# Patient Record
Sex: MALE | Race: BLACK OR AFRICAN AMERICAN | URBAN - METROPOLITAN AREA
[De-identification: names, ages, dates, MRNs, and addresses within clinical notes are randomized per-mention and may not be internally consistent; named-entity substitution may affect disease eponyms.]

---

## 2017-01-03 ENCOUNTER — PRE VISIT (OUTPATIENT)
Dept: UROLOGY | Facility: CLINIC | Age: 67
End: 2017-01-03

## 2017-01-03 NOTE — TELEPHONE ENCOUNTER
Per pt, he had a combined hernia/circ/vasectomy done at [facility] in 1980 by Dr. Ranjit Cook. Per [facility], these records are located at [facility]. Messages left requesting information to obtain these records. fuad

## 2017-03-10 ENCOUNTER — OFFICE VISIT (OUTPATIENT)
Dept: UROLOGY | Facility: CLINIC | Age: 67
End: 2017-03-10

## 2017-03-10 ENCOUNTER — ALLIED HEALTH/NURSE VISIT (OUTPATIENT)
Dept: UROLOGY | Facility: CLINIC | Age: 67
End: 2017-03-10

## 2017-03-10 VITALS — DIASTOLIC BLOOD PRESSURE: 71 MMHG | SYSTOLIC BLOOD PRESSURE: 115 MMHG | HEART RATE: 83 BPM | WEIGHT: 184 LBS

## 2017-03-10 DIAGNOSIS — Z98.52 S/P VASECTOMY: Primary | ICD-10-CM

## 2017-03-10 RX ORDER — HYDROCHLOROTHIAZIDE 25 MG/1
25 TABLET ORAL
COMMUNITY
Start: 2016-12-08

## 2017-03-10 RX ORDER — LISINOPRIL 40 MG/1
40 TABLET ORAL
COMMUNITY
Start: 2016-12-08

## 2017-03-10 RX ORDER — TIZANIDINE HYDROCHLORIDE 4 MG/1
4 CAPSULE, GELATIN COATED ORAL
COMMUNITY
Start: 2016-09-14 | End: 2018-03-23

## 2017-03-10 RX ORDER — TADALAFIL 10 MG/1
10 TABLET ORAL
COMMUNITY
Start: 2016-12-08 | End: 2018-03-23

## 2017-03-10 RX ORDER — CEFAZOLIN SODIUM 1 G/3ML
1 INJECTION, POWDER, FOR SOLUTION INTRAMUSCULAR; INTRAVENOUS SEE ADMIN INSTRUCTIONS
Status: CANCELLED | OUTPATIENT
Start: 2017-03-10

## 2017-03-10 ASSESSMENT — PAIN SCALES - GENERAL: PAINLEVEL: NO PAIN (0)

## 2017-03-10 NOTE — MR AVS SNAPSHOT
After Visit Summary   3/10/2017    Sushant García    MRN: [id number]           Patient Information     Date Of Birth          1950        Visit Information        Provider Department      3/10/2017 10:40 AM Jan Gentile MD [facility] and [facility] Urologic C[facility]        Today's Diagnoses     S/P vasectomy    -  1       Follow-ups after your visit        Your next 10 appointments already scheduled     May 30, 2017   Procedure with Jan Gentile MD   [facility] ([facility])    [address] [place] [address]  5th [facility] [address] [phone]           Located in the Clinics and Surgery Center at [address] [place] [address].   parking is very convenient and highly recommended.  is a $6 flat rate fee.  Both  and self parkers should enter the main arrival plaza from [facility]; parking attendants will direct you based on your parking preference.            Minor 15, 2017  9:00 AM CDT   (Arrive by 8:45 AM)   Post-Op with Jan Gentile MD   [facility] and [facility]rolog[facility] ([facility])    [address] [place] [address]  4th [facility] [address] [phone]              Who to contact     Please call your clinic at [phone] to:    Ask questions about your health    Make or cancel appointments    Discuss your medicines    Learn about your test results    Speak to your doctor   If you have compliments or concerns about an experience at your clinic, or if you wish to file a complaint, please contact [facility] Patient Relations at [phone] or email us at [email or web address]         Additional Information About Your Visit        MyChart Information     BlueConic is an electronic gateway that provides easy, online access to your medical records. With BlueConic, you can request a clinic  appointment, read your test results, renew a prescription or communicate with your care team.     To sign up for iConnectivityhart visit the website at [email or web address]   You will be asked to enter the access code listed below, as well as some personal information. Please follow the directions to create your username and password.     Your access code is: [email or web address]  Expires: 2017  6:30 AM     Your access code will  in 90 days. If you need help or a new code, please contact your [facility] or call [phone] for assistance.        Care EveryWhere ID     This is your Care EveryWhere ID. This could be used by other organizations to access your [place] medical records  [id number]        Your Vitals Were     Pulse                   83            Blood Pressure from Last 3 Encounters:   03/10/17 115/71   11 136/85    Weight from Last 3 Encounters:   03/10/17 83.5 kg (184 lb)   11 83.5 kg (184 lb)              We Performed the Following     Daniella-Operative Worksheet  (Urology General)        Primary Care Provider    Physician No Ref-Primary       No address on file        Thank you!     Thank you for choosing [facility] UROLOGY AND [facility] FOR PROSTATE AND UROLOGIC CANCERS  for your care. Our goal is always to provide you with excellent care. Hearing back from our patients is one way we can continue to improve our services. Please take a few minutes to complete the written survey that you may receive in the mail after your visit with us. Thank you!             Your Updated Medication List - Protect others around you: Learn how to safely use, store and throw away your medicines at www.disposemymeds.org.          This list is accurate as of: 3/10/17  1:39 PM.  Always use your most recent med list.                   Brand Name Dispense Instructions for use    * HYDROCHLOROTHIAZIDE      25 mg daily.       * hydrochlorothiazide 25 MG tablet    HYDRODIURIL     Take 25 mg  by mouth       * lisinopril 40 MG tablet    PRINIVIL/ZESTRIL     Take 40 mg by mouth daily.       * lisinopril 40 MG tablet    PRINIVIL/ZESTRIL     Take 40 mg by mouth       tadalafil 10 MG tablet    CIALIS     Take 10 mg by mouth       tiZANidine 4 MG capsule    ZANAFLEX     Take 4 mg by mouth       * Notice:  This list has 4 medication(s) that are the same as other medications prescribed for you. Read the directions carefully, and ask your doctor or other care provider to review them with you.

## 2017-03-10 NOTE — LETTER
3/10/2017       RE: Sushant García  [id number]  [facility]   [place] [address]     Dear Colleague,    Thank you for referring your patient, Sushant García, to the [facility] UROLOGY AND INST FOR PROSTATE AND UROLOGIC CANCERS at [facility]. Please see a copy of my visit note below.    Mr. Sushant García is here to discuss fertility options post vasectomy .    HPI:  Sushant García  is a pleasant 66 year old gentleman who underwent a vasectomy about 33 years ago. He has 4 children. His vasectomy was  uncomplicated, and he did not do a followup semen analysis.    His spouse is Elda, aged 28. She is G1 with one child.  Her health is good, cycles are good, and female factor concerns are: none known.     He is here to discuss options for fertility post vasectomy.     REVIEW OF SYSTEMS:  General: negative  Skin: negative  Eyes: negative  Ears/Nose/Throat: negative  Respiratory: negative  Cardiovascular: negative  Gastrointestinal: negative  Genitourinary: see HPI  Musculoskeletal: negative  Neurologic: negative  Psychiatric: negative  Hematologic/Lymphatic/Immunologic: negative  Endocrine: negative      Past Medical History   Diagnosis Date     Hypertension         Past Surgical History   Procedure Laterality Date     Hernia repair          No family history on file.     Social History   Substance Use Topics     Smoking status: Never Smoker     Smokeless tobacco: Not on file     Alcohol use No        Medications as of 3/10/2017:  Current Outpatient Prescriptions   Medication Sig     tadalafil (CIALIS) 10 MG tablet Take 10 mg by mouth     hydrochlorothiazide (HYDRODIURIL) 25 MG tablet Take 25 mg by mouth     lisinopril (PRINIVIL/ZESTRIL) 40 MG tablet Take 40 mg by mouth     tiZANidine (ZANAFLEX) 4 MG capsule Take 4 mg by mouth     HYDROCHLOROTHIAZIDE 25 mg daily.     lisinopril (PRINIVIL,ZESTRIL) 40 MG tablet Take 40 mg by mouth daily.     No current  facility-administered medications for this visit.           Allergies   Allergen Reactions     Atenolol      Other reaction(s): Cough       GENERAL PHYSICAL EXAM:   /71 (BP Location: Right arm, Cuff Size: Adult Large)  Pulse 83  Wt 83.5 kg (184 lb)     Constitutional: No acute distress. Well nourished.   PSYCH: normal mood and affect.  NEURO: normal gait, no focal deficits.   EYES: anicteric, EOMI, PERR.  ENT: neck supple, no lymphadenopathy, mucosae moist, no thrush.  CARDIOPULMONARY: breathing non-labored, pulse regular rate/rhythm, no peripheral edema.  GI: Abdomen soft, non-tender, bilateral inguinal surgical scars, no organomegaly.  MUSCULOSKELETAL: normal limb proportions, no muscle wasting, no contractures.  SKIN: Normal virilized hair distribution, no lesions, warts or rashes over genitalia, abdomen extremities or face.  HEME/LYMPH: no echymosis, no lymphadenopathy in groin or neck, no lymphedema.     EXAM:  Phallus  circumcised, meatus adequate, no plaques palpated.   Left testis descended , size is 12-14cc , consistency is normal . No intra-testicular masses.   Right testis descended , size is 18cc , consistency is normal . No intra-testicular masses.   Epididymes present, non-tender, both enlarged consistent with post vasectomy.   Left cord: Vas present. no granuloma.  Defect in the mid straight portion of the left vas.  Right cord: Vas present. no granuloma.  Defect in the mid straight portion of the right vas.  Grade II-III left varicocele.    Prostate exam: deferred    Imaging/labs:  None    ASSESSMENT: Fertility options post vasectomy 33 years ago.    PLAN:  I reviewed the pros and cons of vasectomy reversal versus  sperm acquisition for use in in-vitro fertilization.   We discussed the considerations of invasiveness to either partner, the number of children desired, cost, and need for possible re-sterilization after a vasectomy reversal.   Overall, either IVF or vas reversal  would be options  for them.   We discussed the patency and pregnancy rates with vasovasostomy and vasoepididymostomy.  We discussed that he hopefully would be a vasovasostomy on each side, but certainly could require a vasoepididymostomy on one or both sides, and that the likelihood of VE increases as time from the initial vasectomy increases.  We discussed sperm retrieval, which could likely be done with testicular aspiration under local in the office and this could then be cryopreserved and used for IVF    I encouraged them to think about their options and let me know how they would like to proceed.       Jan Gentile MD,    Urological Surgeon

## 2017-03-10 NOTE — NURSING NOTE
Pre Op Teaching Flowsheet       Pre and Post op Patient Education  Relevant Diagnosis:  History of vasectomy  Surgical procedure:  Bilateral vasectomy  Teaching Topic:  Pre and post op teaching  Person Involved in teaching: Sushant García    Motivation Level:  Asks Questions: Yes  Eager to Learn:  Yes  Cooperative: Yes  Receptive (willing/able to accept information):  Yes    Patient demonstrates understanding of the following:  Date of surgery:  5/30/17  Location of surgery:  [facility]- 5th Floor  History and Physical and any other testing necessary prior to surgery: Yes  Required time line for completion of History and Physical and any pre-op testing: Yes    Patient demonstrates understanding of the following:  Pre-op bowel prep:  N/A  Pre-op showering/scrub information with PCMX Soap: Yes  Blood thinner medications discussed and when to stop (if applicable):  Yes      Infection Prevention:   Patient demonstrates understanding of the following:  Surgical procedure site care taught: Yes  Signs and symptoms of infection taught:  Yes      Post-op follow-up:  Discussed how to contact the hospital, nurse, and clinic scheduling staff if necessary.    Instructional materials used/given/mailed:  [facility] Booklet, post op teaching sheet, Map, Soap, and arrival/location information.    Surgical instructions packet given to patient in office:  Yes.  Negative on PAC questions

## 2017-03-10 NOTE — MR AVS SNAPSHOT
After Visit Summary   3/10/2017    Sushant García    MRN: [id number]           Patient Information     Date Of Birth          1950        Visit Information        Provider Department      3/10/2017 12:00 PM Nurse, [facility] and [facility] for Prostate and Urologic Cancers        Today's Diagnoses     S/P vasectomy    -  1       Follow-ups after your visit        Your next 10 appointments already scheduled     May 30, 2017   Procedure with Jan Gentile MD   [facility] ([facility])    [address] [place] [address]  5th [facility] [id number] [phone]           Located in the Clinics and Surgery Center at [address] [place] [address].   parking is very convenient and highly recommended.  is a $6 flat rate fee.  Both  and self parkers should enter the main arrival plaza from [facility]; parking attendants will direct you based on your parking preference.            Minor 15, 2017  9:00 AM CDT   (Arrive by 8:45 AM)   Post-Op with Jan Gentile MD   [facility] and [facility] for Prostate and Urologic Cancers ([facility])    [address] [place] [address]  4th [facility] [address] [phone]              Who to contact     Please call your clinic at [phone] to:    Ask questions about your health    Make or cancel appointments    Discuss your medicines    Learn about your test results    Speak to your doctor   If you have compliments or concerns about an experience at your clinic, or if you wish to file a complaint, please contact [facility] Patient Relations at [phone] or email us at [email or web address]         Additional Information About Your Visit        Abigail Stewarthart Information     Open Mile is an electronic gateway that provides easy, online access to your medical records. With Open Mile, you can request a  clinic appointment, read your test results, renew a prescription or communicate with your care team.     To sign up for Beam Technologieshart visit the website at [email or web address]   You will be asked to enter the access code listed below, as well as some personal information. Please follow the directions to create your username and password.     Your access code is: [email or web address]  Expires: 2017  6:30 AM     Your access code will  in 90 days. If you need help or a new code, please contact your [facility] or call [phone] for assistance.        Care EveryWhere ID     This is your Care EveryWhere ID. This could be used by other organizations to access your [place] medical records  [id number]         Blood Pressure from Last 3 Encounters:   03/10/17 115/71   11 136/85    Weight from Last 3 Encounters:   03/10/17 83.5 kg (184 lb)   11 83.5 kg (184 lb)              Today, you had the following     No orders found for display       Primary Care Provider    Physician No Ref-Primary       No address on file        Thank you!     Thank you for choosing [facility]OLOGY AND [facility] FOR PROSTATE AND UROLOGIC CANCERS  for your care. Our goal is always to provide you with excellent care. Hearing back from our patients is one way we can continue to improve our services. Please take a few minutes to complete the written survey that you may receive in the mail after your visit with us. Thank you!             Your Updated Medication List - Protect others around you: Learn how to safely use, store and throw away your medicines at www.disposemymeds.org.          This list is accurate as of: 3/10/17  2:54 PM.  Always use your most recent med list.                   Brand Name Dispense Instructions for use    * HYDROCHLOROTHIAZIDE      25 mg daily.       * hydrochlorothiazide 25 MG tablet    HYDRODIURIL     Take 25 mg by mouth       * lisinopril 40 MG tablet    PRINIVIL/ZESTRIL      Take 40 mg by mouth daily.       * lisinopril 40 MG tablet    PRINIVIL/ZESTRIL     Take 40 mg by mouth       tadalafil 10 MG tablet    CIALIS     Take 10 mg by mouth       tiZANidine 4 MG capsule    ZANAFLEX     Take 4 mg by mouth       * Notice:  This list has 4 medication(s) that are the same as other medications prescribed for you. Read the directions carefully, and ask your doctor or other care provider to review them with you.

## 2017-03-10 NOTE — PROGRESS NOTES
Mr. Sushant García is here to discuss fertility options post vasectomy .    HPI:  Sushant García  is a pleasant 66 year old gentleman who underwent a vasectomy about 33 years ago. He has 4 children. His vasectomy was  uncomplicated, and he did not do a followup semen analysis.    His spouse is Elda, aged 28. She is G1 with one child.  Her health is good, cycles are good, and female factor concerns are: none known.     He is here to discuss options for fertility post vasectomy.     REVIEW OF SYSTEMS:  General: negative  Skin: negative  Eyes: negative  Ears/Nose/Throat: negative  Respiratory: negative  Cardiovascular: negative  Gastrointestinal: negative  Genitourinary: see HPI  Musculoskeletal: negative  Neurologic: negative  Psychiatric: negative  Hematologic/Lymphatic/Immunologic: negative  Endocrine: negative      Past Medical History   Diagnosis Date     Hypertension         Past Surgical History   Procedure Laterality Date     Hernia repair          No family history on file.     Social History   Substance Use Topics     Smoking status: Never Smoker     Smokeless tobacco: Not on file     Alcohol use No        Medications as of 3/10/2017:  Current Outpatient Prescriptions   Medication Sig     tadalafil (CIALIS) 10 MG tablet Take 10 mg by mouth     hydrochlorothiazide (HYDRODIURIL) 25 MG tablet Take 25 mg by mouth     lisinopril (PRINIVIL/ZESTRIL) 40 MG tablet Take 40 mg by mouth     tiZANidine (ZANAFLEX) 4 MG capsule Take 4 mg by mouth     HYDROCHLOROTHIAZIDE 25 mg daily.     lisinopril (PRINIVIL,ZESTRIL) 40 MG tablet Take 40 mg by mouth daily.     No current facility-administered medications for this visit.           Allergies   Allergen Reactions     Atenolol      Other reaction(s): Cough       GENERAL PHYSICAL EXAM:   /71 (BP Location: Right arm, Cuff Size: Adult Large)  Pulse 83  Wt 83.5 kg (184 lb)     Constitutional: No acute distress. Well nourished.   PSYCH: normal mood and  affect.  NEURO: normal gait, no focal deficits.   EYES: anicteric, EOMI, PERR.  ENT: neck supple, no lymphadenopathy, mucosae moist, no thrush.  CARDIOPULMONARY: breathing non-labored, pulse regular rate/rhythm, no peripheral edema.  GI: Abdomen soft, non-tender, bilateral inguinal surgical scars, no organomegaly.  MUSCULOSKELETAL: normal limb proportions, no muscle wasting, no contractures.  SKIN: Normal virilized hair distribution, no lesions, warts or rashes over genitalia, abdomen extremities or face.  HEME/LYMPH: no echymosis, no lymphadenopathy in groin or neck, no lymphedema.     EXAM:  Phallus  circumcised, meatus adequate, no plaques palpated.   Left testis descended , size is 12-14cc , consistency is normal . No intra-testicular masses.   Right testis descended , size is 18cc , consistency is normal . No intra-testicular masses.   Epididymes present, non-tender, both enlarged consistent with post vasectomy.   Left cord: Vas present. no granuloma.  Defect in the mid straight portion of the left vas.  Right cord: Vas present. no granuloma.  Defect in the mid straight portion of the right vas.  Grade II-III left varicocele.    Prostate exam: deferred    Imaging/labs:  None    ASSESSMENT: Fertility options post vasectomy 33 years ago.    PLAN:  I reviewed the pros and cons of vasectomy reversal versus  sperm acquisition for use in in-vitro fertilization.   We discussed the considerations of invasiveness to either partner, the number of children desired, cost, and need for possible re-sterilization after a vasectomy reversal.   Overall, either IVF or vas reversal  would be options for them.   We discussed the patency and pregnancy rates with vasovasostomy and vasoepididymostomy.  We discussed that he hopefully would be a vasovasostomy on each side, but certainly could require a vasoepididymostomy on one or both sides, and that the likelihood of VE increases as time from the initial vasectomy increases.  We  discussed sperm retrieval, which could likely be done with testicular aspiration under local in the office and this could then be cryopreserved and used for IVF    I encouraged them to think about their options and let me know how they would like to proceed.       Jan Gentile MD,    Urological Surgeon

## 2017-05-15 ENCOUNTER — TELEPHONE (OUTPATIENT)
Dept: UROLOGY | Facility: CLINIC | Age: 67
End: 2017-05-15

## 2017-05-15 NOTE — TELEPHONE ENCOUNTER
Patient called to R/S due to insurance and having to pay out of packet. Patient was R/S to 11/28/17 at 8:00am with a 6:00am check in. Patients post op was also R/S to Dec. 14 th at 9:00am patient had no further questions.

## 2018-03-23 RX ORDER — ATORVASTATIN CALCIUM 40 MG/1
40 TABLET, FILM COATED ORAL DAILY
COMMUNITY

## 2018-03-26 ENCOUNTER — ANESTHESIA EVENT (OUTPATIENT)
Dept: SURGERY | Facility: AMBULATORY SURGERY CENTER | Age: 68
End: 2018-03-26

## 2018-03-27 ENCOUNTER — SURGERY (OUTPATIENT)
Age: 68
End: 2018-03-27

## 2018-03-27 ENCOUNTER — HOSPITAL ENCOUNTER (OUTPATIENT)
Facility: AMBULATORY SURGERY CENTER | Age: 68
End: 2018-03-27
Attending: UROLOGY
Payer: MEDICARE

## 2018-03-27 ENCOUNTER — ANESTHESIA (OUTPATIENT)
Dept: SURGERY | Facility: AMBULATORY SURGERY CENTER | Age: 68
End: 2018-03-27

## 2018-03-27 VITALS
SYSTOLIC BLOOD PRESSURE: 112 MMHG | WEIGHT: 184 LBS | DIASTOLIC BLOOD PRESSURE: 67 MMHG | RESPIRATION RATE: 16 BRPM | HEIGHT: 67 IN | OXYGEN SATURATION: 95 % | HEART RATE: 78 BPM | TEMPERATURE: 97 F | BODY MASS INDEX: 28.88 KG/M2

## 2018-03-27 DIAGNOSIS — Z31.0 H/O VASOVASOSTOMY: Primary | ICD-10-CM

## 2018-03-27 DIAGNOSIS — Z98.52 S/P VASECTOMY: Primary | ICD-10-CM

## 2018-03-27 RX ORDER — LIDOCAINE HYDROCHLORIDE 20 MG/ML
INJECTION, SOLUTION INFILTRATION; PERINEURAL PRN
Status: DISCONTINUED | OUTPATIENT
Start: 2018-03-27 | End: 2018-03-27

## 2018-03-27 RX ORDER — MAGNESIUM HYDROXIDE 1200 MG/15ML
LIQUID ORAL PRN
Status: DISCONTINUED | OUTPATIENT
Start: 2018-03-27 | End: 2018-03-27 | Stop reason: HOSPADM

## 2018-03-27 RX ORDER — PROPOFOL 10 MG/ML
INJECTION, EMULSION INTRAVENOUS CONTINUOUS PRN
Status: DISCONTINUED | OUTPATIENT
Start: 2018-03-27 | End: 2018-03-27

## 2018-03-27 RX ORDER — SODIUM CHLORIDE, SODIUM LACTATE, POTASSIUM CHLORIDE, CALCIUM CHLORIDE 600; 310; 30; 20 MG/100ML; MG/100ML; MG/100ML; MG/100ML
INJECTION, SOLUTION INTRAVENOUS CONTINUOUS
Status: DISCONTINUED | OUTPATIENT
Start: 2018-03-27 | End: 2018-03-27 | Stop reason: HOSPADM

## 2018-03-27 RX ORDER — ONDANSETRON 2 MG/ML
4 INJECTION INTRAMUSCULAR; INTRAVENOUS EVERY 30 MIN PRN
Status: DISCONTINUED | OUTPATIENT
Start: 2018-03-27 | End: 2018-03-28 | Stop reason: HOSPADM

## 2018-03-27 RX ORDER — ONDANSETRON 2 MG/ML
INJECTION INTRAMUSCULAR; INTRAVENOUS PRN
Status: DISCONTINUED | OUTPATIENT
Start: 2018-03-27 | End: 2018-03-27

## 2018-03-27 RX ORDER — EPHEDRINE SULFATE 50 MG/ML
INJECTION, SOLUTION INTRAMUSCULAR; INTRAVENOUS; SUBCUTANEOUS PRN
Status: DISCONTINUED | OUTPATIENT
Start: 2018-03-27 | End: 2018-03-27

## 2018-03-27 RX ORDER — LIDOCAINE 40 MG/G
CREAM TOPICAL
Status: DISCONTINUED | OUTPATIENT
Start: 2018-03-27 | End: 2018-03-27 | Stop reason: HOSPADM

## 2018-03-27 RX ORDER — NALOXONE HYDROCHLORIDE 0.4 MG/ML
.1-.4 INJECTION, SOLUTION INTRAMUSCULAR; INTRAVENOUS; SUBCUTANEOUS
Status: DISCONTINUED | OUTPATIENT
Start: 2018-03-27 | End: 2018-03-28 | Stop reason: HOSPADM

## 2018-03-27 RX ORDER — ONDANSETRON 4 MG/1
4 TABLET, ORALLY DISINTEGRATING ORAL EVERY 30 MIN PRN
Status: DISCONTINUED | OUTPATIENT
Start: 2018-03-27 | End: 2018-03-28 | Stop reason: HOSPADM

## 2018-03-27 RX ORDER — FENTANYL CITRATE 50 UG/ML
25-50 INJECTION, SOLUTION INTRAMUSCULAR; INTRAVENOUS
Status: DISCONTINUED | OUTPATIENT
Start: 2018-03-27 | End: 2018-03-27 | Stop reason: HOSPADM

## 2018-03-27 RX ORDER — SODIUM CHLORIDE, SODIUM LACTATE, POTASSIUM CHLORIDE, CALCIUM CHLORIDE 600; 310; 30; 20 MG/100ML; MG/100ML; MG/100ML; MG/100ML
INJECTION, SOLUTION INTRAVENOUS CONTINUOUS
Status: DISCONTINUED | OUTPATIENT
Start: 2018-03-27 | End: 2018-03-28 | Stop reason: HOSPADM

## 2018-03-27 RX ORDER — PROPOFOL 10 MG/ML
INJECTION, EMULSION INTRAVENOUS PRN
Status: DISCONTINUED | OUTPATIENT
Start: 2018-03-27 | End: 2018-03-27

## 2018-03-27 RX ORDER — FENTANYL CITRATE 50 UG/ML
INJECTION, SOLUTION INTRAMUSCULAR; INTRAVENOUS PRN
Status: DISCONTINUED | OUTPATIENT
Start: 2018-03-27 | End: 2018-03-27

## 2018-03-27 RX ORDER — OXYCODONE AND ACETAMINOPHEN 5; 325 MG/1; MG/1
1 TABLET ORAL EVERY 6 HOURS PRN
Qty: 24 TABLET | Refills: 0 | Status: SHIPPED | OUTPATIENT
Start: 2018-03-27

## 2018-03-27 RX ORDER — DEXAMETHASONE SODIUM PHOSPHATE 4 MG/ML
INJECTION, SOLUTION INTRA-ARTICULAR; INTRALESIONAL; INTRAMUSCULAR; INTRAVENOUS; SOFT TISSUE PRN
Status: DISCONTINUED | OUTPATIENT
Start: 2018-03-27 | End: 2018-03-27

## 2018-03-27 RX ORDER — AMOXICILLIN 250 MG
1-2 CAPSULE ORAL 2 TIMES DAILY
Qty: 100 TABLET | Refills: 0 | Status: SHIPPED | OUTPATIENT
Start: 2018-03-27

## 2018-03-27 RX ORDER — BUPIVACAINE HYDROCHLORIDE 5 MG/ML
INJECTION, SOLUTION PERINEURAL PRN
Status: DISCONTINUED | OUTPATIENT
Start: 2018-03-27 | End: 2018-03-27 | Stop reason: HOSPADM

## 2018-03-27 RX ORDER — GABAPENTIN 300 MG/1
300 CAPSULE ORAL ONCE
Status: COMPLETED | OUTPATIENT
Start: 2018-03-27 | End: 2018-03-27

## 2018-03-27 RX ORDER — ACETAMINOPHEN 325 MG/1
975 TABLET ORAL ONCE
Status: COMPLETED | OUTPATIENT
Start: 2018-03-27 | End: 2018-03-27

## 2018-03-27 RX ADMIN — SODIUM CHLORIDE, SODIUM LACTATE, POTASSIUM CHLORIDE, CALCIUM CHLORIDE: 600; 310; 30; 20 INJECTION, SOLUTION INTRAVENOUS at 06:40

## 2018-03-27 RX ADMIN — EPHEDRINE SULFATE 5 MG: 50 INJECTION, SOLUTION INTRAMUSCULAR; INTRAVENOUS; SUBCUTANEOUS at 08:04

## 2018-03-27 RX ADMIN — GABAPENTIN 300 MG: 300 CAPSULE ORAL at 06:30

## 2018-03-27 RX ADMIN — ONDANSETRON 4 MG: 2 INJECTION INTRAMUSCULAR; INTRAVENOUS at 09:50

## 2018-03-27 RX ADMIN — FENTANYL CITRATE 50 MCG: 50 INJECTION, SOLUTION INTRAMUSCULAR; INTRAVENOUS at 07:58

## 2018-03-27 RX ADMIN — EPHEDRINE SULFATE 5 MG: 50 INJECTION, SOLUTION INTRAMUSCULAR; INTRAVENOUS; SUBCUTANEOUS at 08:12

## 2018-03-27 RX ADMIN — FENTANYL CITRATE 25 MCG: 50 INJECTION, SOLUTION INTRAMUSCULAR; INTRAVENOUS at 10:00

## 2018-03-27 RX ADMIN — Medication 100 MCG: at 08:52

## 2018-03-27 RX ADMIN — PROPOFOL 170 MG: 10 INJECTION, EMULSION INTRAVENOUS at 08:00

## 2018-03-27 RX ADMIN — LIDOCAINE HYDROCHLORIDE 100 MG: 20 INJECTION, SOLUTION INFILTRATION; PERINEURAL at 08:00

## 2018-03-27 RX ADMIN — Medication 100 MCG: at 08:43

## 2018-03-27 RX ADMIN — ACETAMINOPHEN 975 MG: 325 TABLET ORAL at 06:30

## 2018-03-27 RX ADMIN — Medication 100 MCG: at 08:47

## 2018-03-27 RX ADMIN — SODIUM CHLORIDE, SODIUM LACTATE, POTASSIUM CHLORIDE, CALCIUM CHLORIDE: 600; 310; 30; 20 INJECTION, SOLUTION INTRAVENOUS at 10:00

## 2018-03-27 RX ADMIN — MAGNESIUM HYDROXIDE 500 ML: 1200 LIQUID ORAL at 08:22

## 2018-03-27 RX ADMIN — PROPOFOL 200 MCG/KG/MIN: 10 INJECTION, EMULSION INTRAVENOUS at 08:00

## 2018-03-27 RX ADMIN — DEXAMETHASONE SODIUM PHOSPHATE 4 MG: 4 INJECTION, SOLUTION INTRA-ARTICULAR; INTRALESIONAL; INTRAMUSCULAR; INTRAVENOUS; SOFT TISSUE at 08:10

## 2018-03-27 RX ADMIN — BUPIVACAINE HYDROCHLORIDE 20 ML: 5 INJECTION, SOLUTION PERINEURAL at 08:21

## 2018-03-27 RX ADMIN — Medication 100 MCG: at 08:58

## 2018-03-27 RX ADMIN — Medication 100 MCG: at 08:29

## 2018-03-27 RX ADMIN — FENTANYL CITRATE 25 MCG: 50 INJECTION, SOLUTION INTRAMUSCULAR; INTRAVENOUS at 10:11

## 2018-03-27 ASSESSMENT — LIFESTYLE VARIABLES: TOBACCO_USE: 0

## 2018-03-27 NOTE — ANESTHESIA PREPROCEDURE EVALUATION
Anesthesia Evaluation     . Pt has had prior anesthetic.     No history of anesthetic complications          ROS/MED HX    ENT/Pulmonary:  - neg pulmonary ROS    (-) tobacco use   Neurologic:  - neg neurologic ROS     Cardiovascular:     (+) hypertension----. : . . . :. . Previous cardiac testing date:results:date: results:ECG reviewed date: results:SB @59 date: results:          METS/Exercise Tolerance:  >4 METS   Hematologic:  - neg hematologic  ROS       Musculoskeletal:  - neg musculoskeletal ROS       GI/Hepatic:  - neg GI/hepatic ROS       Renal/Genitourinary:  - ROS Renal section negative       Endo:  - neg endo ROS       Psychiatric:  - neg psychiatric ROS       Infectious Disease:  - neg infectious disease ROS       Malignancy:      - no malignancy   Other:    - neg other ROS                 Physical Exam  Normal systems: cardiovascular, pulmonary and dental    Airway   Mallampati: I  TM distance: >3 FB  Neck ROM: full    Dental     Cardiovascular   Rhythm and rate: regular and normal      Pulmonary    breath sounds clear to auscultation                    Anesthesia Plan      History & Physical Review  History and physical reviewed and following examination; no interval change.    ASA Status:  1 .    NPO Status:  > 8 hours    Plan for General and LMA with Intravenous and Propofol induction. Maintenance will be TIVA.    PONV prophylaxis:  Ondansetron (or other 5HT-3) and Dexamethasone or Solumedrol       Postoperative Care  Postoperative pain management:  Multi-modal analgesia.      Consents  Anesthetic plan, risks, benefits and alternatives discussed with:  Patient.  Use of blood products discussed: No .   .                          .

## 2018-03-27 NOTE — PROGRESS NOTES
I called and spoke to pt at home just now.  Discussed that his vasectomy was done up in the abdomen beneath his previous hernia repairs, so I was not able to do the vasovasostomy today.  I apologized and he is understanding.  We'll work at getting him a refund for as much as possible and help them get in touch with IVF clinic.  He would like me to do the surgical sperm acquisition for IVF when the time comes.    Sania SUN

## 2018-03-27 NOTE — ANESTHESIA CARE TRANSFER NOTE
Patient: Sushant García    Procedure(s):  Scrotal exploration - Wound Class: I-Clean    Diagnosis: Status Post Vasectomy  Diagnosis Additional Information: No value filed.    Anesthesia Type:   No value filed.     Note:  Airway :Face Mask  Patient transferred to:PACU  Comments: %Uneventful transport to PACU   Report to RN  Exchanging well  Pt responds appropriately to command  Pt comfortable  IV patent  Lips/teeth/dentition as preop status  Questions answered  /69  HR 91 nsr  TAT  36.5  RR 16  Sat  99%Handoff Report: Identifed the Patient, Identified the Reponsible Provider, Reviewed the pertinent medical history, Discussed the surgical course, Reviewed Intra-OP anesthesia mangement and issues during anesthesia, Set expectations for post-procedure period and Allowed opportunity for questions and acknowledgement of understanding      Vitals: (Last set prior to Anesthesia Care Transfer)    CRNA VITALS  3/27/2018 0952 - 3/27/2018 1028      3/27/2018             Pulse: 89    SpO2: 98 %    Resp Rate (observed): 12                Electronically Signed By: HEATHER BAKER CRNA  March 27, 2018  10:28 AM

## 2018-03-27 NOTE — ANESTHESIA POSTPROCEDURE EVALUATION
Patient: Sushant García    Procedure(s):  Scrotal exploration - Wound Class: I-Clean    Diagnosis:Status Post Vasectomy  Diagnosis Additional Information: No value filed.    Anesthesia Type:  No value filed.    Note:  Anesthesia Post Evaluation    Patient location during evaluation: Phase 2  Patient participation: Able to fully participate in evaluation  Level of consciousness: awake and alert  Pain management: adequate  Airway patency: patent  Cardiovascular status: acceptable  Respiratory status: acceptable  Hydration status: acceptable  PONV: none     Anesthetic complications: None          Last vitals:  Vitals:    03/27/18 1045 03/27/18 1050 03/27/18 1058   BP: 101/76 104/70 110/77   Resp: 8 12 11   Temp:  36.1  C (97  F) 36.1  C (97  F)   SpO2: 99% 99% 99%         Electronically Signed By: Kit Rehman DO  March 27, 2018  11:07 AM

## 2018-03-27 NOTE — IP AVS SNAPSHOT
[facility]    [address] [place] [address]    [address] [facility] [address]    Phone:  [phone]    Fax:  [phone]                                       After Visit Summary   3/27/2018    Sushant García    MRN: [id number]           After Visit Summary Signature Page     I have received my discharge instructions, and my questions have been answered. I have discussed any challenges I see with this plan with the nurse or doctor.    ..........................................................................................................................................  Patient/Patient Representative Signature      ..........................................................................................................................................  Patient Representative Print Name and Relationship to Patient    ..................................................               ................................................  Date                                            Time    ..........................................................................................................................................  Reviewed by Signature/Title    ...................................................              ..............................................  Date                                                            Time

## 2018-03-27 NOTE — OP NOTE
OPERATIVE REPORT    PREOPERATIVE DIAGNOSIS: History of vasectomy, desiring fertility   POSTOPERATIVE DIAGNOSIS: History of vasectomy, desiring fertility    PROCEDURE:  1. Scrotal exploration    ANESTHESIA: General endotracheal and local anesthesia.    STAFF SURGEON: Jan Gentile MD      RESIDENT SURGEON: Malcom Cortes MD    ESTIMATED BLOOD LOSS: <5 mL  COMPLICATIONS: None immediately.   DRAINS: None  SPECIMENS: None    FINDINGS: Efflux of testicular fluid seen from testicular end of R vas and intraoperative light microscopy revealed motile sperm seen in fluids.      INDICATIONS: Sushant García is an 67 year old gentleman who underwent a vasectomy in 1984. He has interested in a reversal to have additional children. After a discussion of risks, benefits, and alternatives, the patient provided consented to undergo the aforementioned procedure.  In preoperative clinic and today before incision, the vasectomy site felt palpable in the scrotal vas deferens bilaterally.    DESCRIPTION OF THE PROCEDURE: After verifying informed consent, the patient was brought to the operating room and placed in the supine position on the operating room table. All pressure points were adequately padded and pneumatic compression devices were applied to the patient's bilateral lower extremities. The patient was prepped and draped in the usual sterile fashion. A timeout was performed to confirm the correct patient, positioning, procedure, and operative sites.     We began the procedure on the right side. 2 cc of a 1:1 mixture of 1% lidocaine and 0.25% Marcaine was applied over the superficial skin over an approximately 4 cm area. A 15 blade scalpel was used to incise the skin, and cautery was used to dissect down through the dartos muscle to the tunica vaginalis. The right testis was delivered from the incision.    We turned our attention to the vas deferens, and were unable to identify the site of the previous vasectomy in the scrotum.  It  was thought that due he patient's prior history of bilateral inguinal hernia repair, that the vasectomy incision was made higher in the cord, possible close to the external inguinal ring.  As we had already made a scrotal approach, an area of vas in the scrotum was carefully dissected free from the inferior vasculature. A partial vasotomy at the junction of the straight and convoluted vas deferens was made and a sample of clear liquid efflux was observed from the testicular end of the vas.  Sperm was confirmed with light microscopy.  We then irrigated the distal vas, which was found to be obstructed.  We then cannulated the distal end with a 2-0 prolene suture and found that the area of obstruction was in the inguinal area.  The vasotomy was closed in 2 layer fashion with interrupted 10-0 nylon suture from the inner layer, and interrupted 9-0 nylon for the outer layer.  We proceeded to make an inguinal incision in hopes of isolating the vasectomy defect close to the external ring.  Unfortunately, we were not able to appreciate the vasectomy defect in this area.  When it became clear that the vasectomy ligation was made like proximal to the external ring, the decision was made to abort any attempt at vasovasostomy, as this would require a much more extensive incision, as well as taking down his previous hernia repair.  Also there was a good degree of scar tissue in this area and I was worried the vasectomy defect would still not be identifiable due to extensive scarring under the herniorrhaphy. After conferring with the patient's spouse, the decision was made to conclude the procedure.  We closed the Campers fascia with running 3-0 chomic and subcutanous 4-0 monocryl for the subinguinal incision.  The dartos muscle was closed with a running 3-0 chromic and the scrotal skin was closed a running 4-0 Monocryl suture, placed in a subcuticular fashion. An additional 10 cc's of local anesthesia was instilled over the  incisions.    The patient tolerated the procedure well, was awakened from anesthesia, and returned to the recovery room in stable condition. Fluffs and a scrotal supporter were placed    I was present and scrubbed for the entire procedure.  Jan Gentile MD  Urology Staff

## 2018-03-27 NOTE — IP AVS SNAPSHOT
MRN:[id number]                      After Visit Summary   3/27/2018    Sushant García    MRN: [id number]           Thank you!     Thank you for choosing [facility] for your care. Our goal is always to provide you with excellent care. Hearing back from our patients is one way we can continue to improve our services. Please take a few minutes to complete the written survey that you may receive in the mail after you visit with us. Thank you!        Patient Information     Date Of Birth          1950        About your hospital stay     You were admitted on:  March 27, 2018 [facility]    You were discharged on:  March 27, 2018       Who to Call     For medical emergencies, please call 911.  For non-urgent questions about your medical care, please call your primary care provider or clinic, [phone]  For questions related to your surgery, please call your surgery clinic        Attending Provider     Provider Specialty    Jan Gentile MD Urology       Primary Care Provider Office Phone # Fax #    Stephanie Kaplan [phone]      After Care Instructions     Discharge Instructions       Activity  - No strenuous exercise for 3 weeks.  - No lifting, pushing, pulling more than 15 pounds for 3 weeks.   - Do not strain with bowel movements.  - Do not drive until you can press the brake pedal quickly and fully without pain.   - Do not operate a motor vehicle while taking narcotic pain medications.     Incisions  - You may shower and get incisions wet starting 48 hrs after surgery.  - Do not scrub incisions or submerge wounds (bath, pool, hot tub, etc) for 2 weeks.   - Your stitches will fall out on their own in 2-4 weeks  - Leave incision open to air.  Cover with gauze only if needed for comfort or to protect clothing from drainage.     Medications  - Do not take any additional Tylenol (acetaminophen) while using Percocet or  Vicodin.  - Do not take more than 4,000mg of Tylenol (acetaminophen) in any 24 hour period, as this can cause liver damage.  - Take stool softeners such as Senna while you are using narcotics, but stop if you develop diarrhea.   - Wean yourself off of narcotic pain medications.     Follow-Up:  - Call or return sooner than your regularly scheduled visit if you develop any of the following:  fever, uncontrolled pain, uncontrolled nausea or vomiting, as well as increased redness, swelling, or drainage from your wound.  You may call the Urology Clinic during daytime hours at [phone].  If after hours, call [phone] and ask to speak with the Urology resident on call.                  Your next 10 appointments already scheduled     Apr 12, 2018  9:00 AM CDT   (Arrive by 8:45 AM)   Post-Op with Jan Gentile MD   [facility] ([facility])    [address]  [address] [facility] [address] [phone]              Further instructions from your care team       [facility]  Home Care Following Anesthesia  For 24 hours after surgery:  1. Get plenty of rest.  A responsible adult must stay with you for at least 24 hours after you leave the surgery center.  2. Do not drive or use heavy equipment.  If you have weakness or tingling, don't drive or use heavy equipment until this feeling goes away.   3. Do not drink alcohol.   4. Avoid strenuous or risky activities.  Ask for help when climbing stairs.  5. You may feel lightheaded.  IF so, sit for a few minutes before standing.  Have someone help you get up.   6. If you have nausea (feel sick to your stomach): Drink only clear liquids such as apple juice, ginger ale, broth or 7-Up.  Rest may also help.  Be sure to drink enough fluids.  Move to a regular diet as you feel able.   7. You may have a slight fever.  Call the doctor if your fever is over  100 F (37.7 C) (taken under the tongue) or lasts longer than 24 hours.  8. You may have a dry mouth, a sore throat, muscle aches or trouble sleeping. These should go away after 24 hours.  9. Do not make important or legal decisions.               Tips for taking pain medications  To get the best pain relief possible, remember these points:    Take pain medications as directed, before pain becomes severe.    Pain medication can upset your stomach: taking it with food may help.    Constipation is a common side effect of pain medication. Drink plenty of  fluids.    Eat foods high in fiber. Take a stool softener if recommended by your doctor or pharmacist.    Do not drink alcohol, drive or operate machinery while taking pain medications.    Ask about other ways to control pain, such as with heat, ice or relaxation.    Tylenol/Acetaminophen Consumption  To help encourage the safe use of acetaminophen, the makers of TYLENOL  have lowered the maximum daily dose for single-ingredient Extra Strength TYLENOL  (acetaminophen) products sold in the [place] from 8 pills per day (4,000 mg) to 6 pills per day (3,000 mg). The dosing interval has also changed from 2 pills every 4-6 hours to 2 pills every 6 hours.    If you feel your pain relief is insufficient, you may take Tylenol/Acetaminophen in addition to your narcotic pain medication.     Be careful not to exceed 3,000 mg of Tylenol/Acetaminophen in a 24 hour period from all sources.    If you are taking extra strength Tylenol/acetaminophen (500 mg), the maximum dose is 6 tablets in 24 hours.    If you are taking regular strength acetaminophen (325 mg), the maximum dose is 9 tablets in 24 hours.    Call a doctor for any of the followin. Signs of infection (fever, growing tenderness at the surgery site, a large amount of drainage or bleeding, severe pain, foul-smelling drainage, redness, swelling).  2. It has been over 8 to 10 hours since surgery and you are still not able to  "urinate (pass water).  3. Headache for over 24 hours.  4. Numbness, tingling or weakness the day after surgery (if you had spinal anesthesia).  Your doctor is:  Dr. Jan Gentile, Prostate and Urology: [phone]                    Or dial [phone] and ask for the resident on call for:  Prostate Urology  For emergency care, call the:  [facility]:  [phone] (TTY for hearing impaired: [phone])                Pending Results     No orders found from 3/25/2018 to 3/28/2018.            Admission Information     Date & Time Provider Department Dept. Phone    3/27/2018 Jan Gentile MD [facility] [phone]      Your Vitals Were     Blood Pressure Pulse Temperature Respirations Height Weight    111/67 75 97  F (36.1  C) (Temporal) 11 1.702 m (5' 7\") 83.5 kg (184 lb)    Pulse Oximetry BMI (Body Mass Index)                94% 28.82 kg/m2          [employer] Information     [employer] is an electronic gateway that provides easy, online access to your medical records. With [employer], you can request a clinic appointment, read your test results, renew a prescription or communicate with your care team.     To sign up for [employer] visit the website at [email or web address]   You will be asked to enter the access code listed below, as well as some personal information. Please follow the directions to create your username and password.     Your access code is: [email or web address]  Expires: 2018 10:20 AM     Your access code will  in 90 days. If you need help or a new code, please contact your [facility] or call [phone] for assistance.        Care EveryWhere ID     This is your Care EveryWhere ID. This could be used by other organizations to access your [facility] medical records  [id number]        Equal Access to Services     SARINA GERMAN AH: Bismark Manuel, joseph daniel, dante lawrence " greg medranoaan ah. So [facility] [place] [phone].    ATENCIÓN: Si shaggyla hammad, tiene a garcia disposición servicios gratuitos de asistencia lingüística. Rox al [phone].    We comply with applicable federal civil rights laws and [place] laws. We do not discriminate on the basis of race, color, national origin, age, disability, sex, sexual orientation, or gender identity.               Review of your medicines      START taking        Dose / Directions    oxyCODONE-acetaminophen 5-325 MG per tablet   Commonly known as:  PERCOCET   Used for:  H/O vasovasostomy        Dose:  1 tablet   Take 1 tablet by mouth every 6 hours as needed for severe pain maximum 6 tablet(s) per day   Quantity:  24 tablet   Refills:  0       senna-docusate 8.6-50 MG per tablet   Commonly known as:  SENOKOT-S;PERICOLACE   Used for:  H/O vasovasostomy        Dose:  1-2 tablet   Take 1-2 tablets by mouth 2 times daily To prevent constipation while taking narcotic pain medication. Start with 1 tablet twice daily. If no bowel movement in 24 hours, increase to 2 tablets twice daily.  Discontinue if you have loose stools or when you are no longer taking narcotics.   Quantity:  100 tablet   Refills:  0         CONTINUE these medicines which have NOT CHANGED        Dose / Directions    atorvastatin 40 MG tablet   Commonly known as:  LIPITOR        Dose:  40 mg   Take 40 mg by mouth daily   Refills:  0       hydrochlorothiazide 25 MG tablet   Commonly known as:  HYDRODIURIL        Dose:  25 mg   Take 25 mg by mouth   Refills:  0       lisinopril 40 MG tablet   Commonly known as:  PRINIVIL/ZESTRIL        Dose:  40 mg   Take 40 mg by mouth   Refills:  0            Where to get your medicines      Some of these will need a paper prescription and others can be bought over the counter. Ask your nurse if you have questions.     Bring a paper prescription for each of these medications     oxyCODONE-acetaminophen 5-325 MG per tablet    senna-docusate  8.6-50 MG per tablet                Protect others around you: Learn how to safely use, store and throw away your medicines at www.disposemymeds.org.        Information about OPIOIDS     PRESCRIPTION OPIOIDS: WHAT YOU NEED TO KNOW    Prescription opioids can be used to help relieve moderate to severe pain and are often prescribed following a surgery or injury, or for certain health conditions. These medications can be an important part of treatment but also come with serious risks. It is important to work with your health care provider to make sure you are getting the safest, most effective care.    WHAT ARE THE RISKS AND SIDE EFFECTS OF OPIOID USE?  Prescription opioids carry serious risks of addiction and overdose, especially with prolonged use. An opioid overdose, often marked by slowed breathing can cause sudden death. The use of prescription opioids can have a number of side effects as well, even when taken as directed:      Tolerance - meaning you might need to take more of a medication for the same pain relief    Physical dependence - meaning you have symptoms of withdrawal when a medication is stopped    Increased sensitivity to pain    Constipation    Nausea, vomiting, and dry mouth    Sleepiness and dizziness    Confusion    Depression    Low levels of testosterone that can result in lower sex drive, energy, and strength    Itching and sweating    RISKS ARE GREATER WITH:    History of drug misuse, substance use disorder, or overdose    Mental health conditions (such as depression or anxiety)    Sleep apnea    Older age (65 years or older)    Pregnancy    Avoid alcohol while taking prescription opioids.   Also, unless specifically advised by your health care provider, medications to avoid include:    Benzodiazepines (such as Xanax or Valium)    Muscle relaxants (such as Soma or Flexeril)    Hypnotics (such as Ambien or Lunesta)    Other prescription opioids    KNOW YOUR OPTIONS:  Talk to your health care  provider about ways to manage your pain that do not involve prescription opioids. Some of these options may actually work better and have fewer risks and side effects:    Pain relievers such as acetaminophen, ibuprofen, and naproxen    Some medications that are also used for depression or seizures    Physical therapy and exercise    Cognitive behavioral therapy, a psychological, goal-directed approach, in which patients learn how to modify physical, behavioral, and emotional triggers of pain and stress    IF YOU ARE PRESCRIBED OPIOIDS FOR PAIN:    Never take opioids in greater amounts or more often than prescribed    Follow up with your primary health care provider and work together to create a plan on how to manage your pain.    Talk about ways to help manage your pain that do not involve prescription opioids    Talk about all concerns and side effects    Help prevent misuse and abuse    Never sell or share prescription opioids    Never use another person's prescription opioids    Store prescription opioids in a secure place and out of reach of others (this may include visitors, children, friends, and family)    Visit www.cdc.gov/drugoverdose to learn about risks of opioid abuse and overdose    If you believe you may be struggling with addiction, tell your health care provider and ask for guidance or call [facility]'s National Helpline at 2-845-592-HELP    LEARN MORE / www.cdc.gov/drugoverdose/prescribing/guideline.html    Safely dispose of unused prescription opioids: Find your local drug take-back programs and more information about the importance of safe disposal at www.doseofreality.mn.gov             Medication List: This is a list of all your medications and when to take them. Check marks below indicate your daily home schedule. Keep this list as a reference.      Medications           Morning Afternoon Evening Bedtime As Needed    atorvastatin 40 MG tablet   Commonly known as:  LIPITOR   Take 40 mg by mouth  daily                                hydrochlorothiazide 25 MG tablet   Commonly known as:  HYDRODIURIL   Take 25 mg by mouth                                lisinopril 40 MG tablet   Commonly known as:  PRINIVIL/ZESTRIL   Take 40 mg by mouth                                oxyCODONE-acetaminophen 5-325 MG per tablet   Commonly known as:  PERCOCET   Take 1 tablet by mouth every 6 hours as needed for severe pain maximum 6 tablet(s) per day                                senna-docusate 8.6-50 MG per tablet   Commonly known as:  SENOKOT-S;PERICOLACE   Take 1-2 tablets by mouth 2 times daily To prevent constipation while taking narcotic pain medication. Start with 1 tablet twice daily. If no bowel movement in 24 hours, increase to 2 tablets twice daily.  Discontinue if you have loose stools or when you are no longer taking narcotics.

## 2018-03-27 NOTE — DISCHARGE INSTRUCTIONS
[facility]  Home Care Following Anesthesia  For 24 hours after surgery:  1. Get plenty of rest.  A responsible adult must stay with you for at least 24 hours after you leave the surgery center.  2. Do not drive or use heavy equipment.  If you have weakness or tingling, don't drive or use heavy equipment until this feeling goes away.   3. Do not drink alcohol.   4. Avoid strenuous or risky activities.  Ask for help when climbing stairs.  5. You may feel lightheaded.  IF so, sit for a few minutes before standing.  Have someone help you get up.   6. If you have nausea (feel sick to your stomach): Drink only clear liquids such as apple juice, ginger ale, broth or 7-Up.  Rest may also help.  Be sure to drink enough fluids.  Move to a regular diet as you feel able.   7. You may have a slight fever.  Call the doctor if your fever is over 100 F (37.7 C) (taken under the tongue) or lasts longer than 24 hours.  8. You may have a dry mouth, a sore throat, muscle aches or trouble sleeping. These should go away after 24 hours.  9. Do not make important or legal decisions.               Tips for taking pain medications  To get the best pain relief possible, remember these points:    Take pain medications as directed, before pain becomes severe.    Pain medication can upset your stomach: taking it with food may help.    Constipation is a common side effect of pain medication. Drink plenty of  fluids.    Eat foods high in fiber. Take a stool softener if recommended by your doctor or pharmacist.    Do not drink alcohol, drive or operate machinery while taking pain medications.    Ask about other ways to control pain, such as with heat, ice or relaxation.    Tylenol/Acetaminophen Consumption  To help encourage the safe use of acetaminophen, the makers of TYLENOL  have lowered the maximum daily dose for single-ingredient Extra Strength TYLENOL  (acetaminophen) products sold in the [place] from 8  pills per day (4,000 mg) to 6 pills per day (3,000 mg). The dosing interval has also changed from 2 pills every 4-6 hours to 2 pills every 6 hours.    If you feel your pain relief is insufficient, you may take Tylenol/Acetaminophen in addition to your narcotic pain medication.     Be careful not to exceed 3,000 mg of Tylenol/Acetaminophen in a 24 hour period from all sources.    If you are taking extra strength Tylenol/acetaminophen (500 mg), the maximum dose is 6 tablets in 24 hours.    If you are taking regular strength acetaminophen (325 mg), the maximum dose is 9 tablets in 24 hours.    Call a doctor for any of the followin. Signs of infection (fever, growing tenderness at the surgery site, a large amount of drainage or bleeding, severe pain, foul-smelling drainage, redness, swelling).  2. It has been over 8 to 10 hours since surgery and you are still not able to urinate (pass water).  3. Headache for over 24 hours.  4. Numbness, tingling or weakness the day after surgery (if you had spinal anesthesia).  Your doctor is:  Dr. Jan Gentile, Prostate and Urology: [phone]                    Or dial [phone] and ask for the resident on call for:  Prostate Urology  For emergency care, call the:  [place] [facility]:  [phone] (TTY for hearing impaired: [phone])

## 2018-03-27 NOTE — BRIEF OP NOTE
[facility]    Brief Operative Note    Pre-operative diagnosis: Status Post Vasectomy  Post-operative diagnosis same  Procedure: Procedure(s):  Scrotal exploration - Wound Class: I-Clean  Surgeon: Surgeon(s) and Role:     * Jan Gentile MD - Primary       Malcom Cortes MD - Assistant  Anesthesia: General   Estimated blood loss: Minimal  Drains: None  Specimens: * No specimens in log *  Findings:   finding of clear liquid from vas, evidence of motile sperm.  Complications: None.  Implants: None.

## 2018-03-30 ENCOUNTER — CARE COORDINATION (OUTPATIENT)
Dept: UROLOGY | Facility: CLINIC | Age: 68
End: 2018-03-30

## 2018-03-30 NOTE — PROGRESS NOTES
Urology Postop Phone Note:    Mr. Sushant García is a 67 year old male who underwent vasectomy reversal on 3/27/18 with Dr. Gentile for a history of status post vasectomy.  His postoperative course was unremarkable and the patient was d/c to home on 3/27/18.    Fevers/chills: Patient denies any fever or chills.  Eating/drinking: Patient is able to eat and drink without any complaints.  Bowel habits: Patient reports having a normal bowel movement.  Urine output voiding Color yellow Clarity clear Odor none    Incisions: Patient denies any signs and symptoms of infection.  Pain: Patient reports pain as a 5 out of 10.  The pain is located surgical area.  Narcotics: The patient has been taking ibuprofen for pain medication and is able to control the pain.    Follow up appointment scheduled on 4/12/18 at 9 am with Dr. Gentile.    Informed the patient of the clinic triage nursing # ([phone] option 2) and urology resident on call # for after hours concerns.    Thanks,   Bri Nielson   Department of Urologic Surgery

## 2018-04-04 ENCOUNTER — PRE VISIT (OUTPATIENT)
Dept: UROLOGY | Facility: CLINIC | Age: 68
End: 2018-04-04

## 2018-04-04 NOTE — TELEPHONE ENCOUNTER
Patient with history of vasectomy, desiring fertility coming in for post operative check up S/P scrotal exploration. Patient chart reviewed, no need for call, all records available and ready for appointment.

## 2018-04-12 ENCOUNTER — OFFICE VISIT (OUTPATIENT)
Dept: UROLOGY | Facility: CLINIC | Age: 68
End: 2018-04-12
Payer: MEDICARE

## 2018-04-12 VITALS
DIASTOLIC BLOOD PRESSURE: 73 MMHG | HEIGHT: 67 IN | SYSTOLIC BLOOD PRESSURE: 108 MMHG | BODY MASS INDEX: 28.88 KG/M2 | WEIGHT: 184 LBS | HEART RATE: 86 BPM

## 2018-04-12 DIAGNOSIS — Z98.52 S/P VASECTOMY: ICD-10-CM

## 2018-04-12 DIAGNOSIS — Z09 POSTOP CHECK: Primary | ICD-10-CM

## 2018-04-12 ASSESSMENT — PAIN SCALES - GENERAL: PAINLEVEL: NO PAIN (0)

## 2018-04-12 NOTE — PROGRESS NOTES
"CC: Sushant García is post-op from bilateral scrotal exploration 3/27/18  HPI: Patient is 2 wks post op.  he has been doing well. No fevers or chills. Pain decreasing.   Exam: /73  Pulse 86  Ht 1.702 m (5' 7\")  Wt 83.5 kg (184 lb)  BMI 28.82 kg/m2   NAD.   Incision healing well. No discharge, erythema or fluctuence suggestive of infection.   No pain, hematoma, or hydrocele.    Discussed with him that intra-op we found his vasectomy was done in the inguinal canal bilaterally at the time of his previous inguinal hernia repairs.  This was not felt to be reparable.    I advise they pursue IVF.  IVF clinic list given.  He will contact me back if he'd like me to do the surgical sperm acquisition.  Discussed this can be done in the office with aspiration or via open biopsy.    PLAN:   F/U  PRN with me     Visit within post-op global.    "

## 2018-04-12 NOTE — NURSING NOTE
"Chief Complaint   Patient presents with     Surgical Followup     history of vasectomy, desiring fertility coming in for post operative check up S/P scrotal exploration       Blood pressure 108/73, pulse 86, height 1.702 m (5' 7\"), weight 83.5 kg (184 lb). Body mass index is 28.82 kg/(m^2).    There is no problem list on file for this patient.      Allergies   Allergen Reactions     Atenolol Cough     Other reaction(s): Cough       Current Outpatient Prescriptions   Medication Sig Dispense Refill     oxyCODONE-acetaminophen (PERCOCET) 5-325 MG per tablet Take 1 tablet by mouth every 6 hours as needed for severe pain maximum 6 tablet(s) per day 24 tablet 0     senna-docusate (SENOKOT-S;PERICOLACE) 8.6-50 MG per tablet Take 1-2 tablets by mouth 2 times daily To prevent constipation while taking narcotic pain medication. Start with 1 tablet twice daily. If no bowel movement in 24 hours, increase to 2 tablets twice daily.  Discontinue if you have loose stools or when you are no longer taking narcotics. 100 tablet 0     atorvastatin (LIPITOR) 40 MG tablet Take 40 mg by mouth daily       hydrochlorothiazide (HYDRODIURIL) 25 MG tablet Take 25 mg by mouth       lisinopril (PRINIVIL/ZESTRIL) 40 MG tablet Take 40 mg by mouth         Social History   Substance Use Topics     Smoking status: Never Smoker     Smokeless tobacco: Never Used     Alcohol use Yes       Marlena Palomares LPN  4/12/2018  8:42 AM    "

## 2018-04-12 NOTE — MR AVS SNAPSHOT
"              After Visit Summary   2018    Sushant García    MRN: [id number]           Patient Information     Date Of Birth          1950        Visit Information        Provider Department      2018 9:00 AM Jan Gentile MD [facility]        Today's Diagnoses     Postop check    -  1    S/P vasectomy           Follow-ups after your visit        Who to contact     Please call your clinic at [phone] to:    Ask questions about your health    Make or cancel appointments    Discuss your medicines    Learn about your test results    Speak to your doctor            Additional Information About Your Visit        MyChart Information     [employer] is an electronic gateway that provides easy, online access to your medical records. With [employer] [facility], you can request a clinic appointment, read your test results, renew a prescription or communicate with your care team.     To sign up for [employer] visit the website at [email or web address]   You will be asked to enter the access code listed below, as well as some personal information. Please follow the directions to create your username and password.     Your access code is: [email or web address]  Expires: 2018 10:20 AM     Your access code will  in 90 days. If you need help or a new code, please contact your [facility] or call [phone] for assistance.        Care EveryWhere ID     This is your Care EveryWhere ID. This could be used by other organizations to access your [facility] medical records  [id number]        Your Vitals Were     Pulse Height BMI (Body Mass Index)             86 1.702 m (5' 7\") 28.82 kg/m2          Blood Pressure from Last 3 Encounters:   18 108/73   18 112/67   03/10/17 115/71    Weight from Last 3 Encounters:   18 83.5 kg (184 lb)   18 83.5 kg (184 lb)   03/10/17 83.5 kg (184 lb)              Today, you had the " following     No orders found for display       Primary Care Provider Office Phone # Fax #    Stephanie Kaplan [phone]       [facility] [address]  [facility] [address]        Equal Access to Services     SARINA GERMAN : Hadii lars ku hadalmazo Soomaali, waaxda luqadaha, qaybta kaalmada adeegyada, dante doein hayaan jalenmuriel van jaron vazquez. So [facility] [phone].    ATENCIÓN: Si habla español, tiene a garcia disposición servicios gratuitos de asistencia lingüística. Llame al [phone].    We comply with applicable federal civil rights laws and [place] laws. We do not discriminate on the basis of race, color, national origin, age, disability, sex, sexual orientation, or gender identity.            Thank you!     Thank you for choosing [facility]OLOGY AND [facility] FOR PROSTATE AND UROLOGIC CANCERS  for your care. Our goal is always to provide you with excellent care. Hearing back from our patients is one way we can continue to improve our services. Please take a few minutes to complete the written survey that you may receive in the mail after your visit with us. Thank you!             Your Updated Medication List - Protect others around you: Learn how to safely use, store and throw away your medicines at www.disposemymeds.org.          This list is accurate as of 4/12/18  9:40 AM.  Always use your most recent med list.                   Brand Name Dispense Instructions for use Diagnosis    atorvastatin 40 MG tablet    LIPITOR     Take 40 mg by mouth daily        hydrochlorothiazide 25 MG tablet    HYDRODIURIL     Take 25 mg by mouth        lisinopril 40 MG tablet    PRINIVIL/ZESTRIL     Take 40 mg by mouth        oxyCODONE-acetaminophen 5-325 MG per tablet    PERCOCET    24 tablet    Take 1 tablet by mouth every 6 hours as needed for severe pain maximum 6 tablet(s) per day    H/O vasovasostomy       senna-docusate 8.6-50 MG per tablet    SENOKOT-S;PERICOLACE    100 tablet    Take  1-2 tablets by mouth 2 times daily To prevent constipation while taking narcotic pain medication. Start with 1 tablet twice daily. If no bowel movement in 24 hours, increase to 2 tablets twice daily.  Discontinue if you have loose stools or when you are no longer taking narcotics.    H/O vasovasostomy

## 2018-04-12 NOTE — LETTER
There are 4 clinics in the [facility] that do all aspects of advanced female infertility care:     1. [facility] [place].  [email or web address].  Office in [place].     2. [facility] [email or web address]  Offices in [facility].        3. [facility] [email or web address].  Office in [place].     4. [facility] [email or web address].  Office in [facility].

## 2018-04-12 NOTE — LETTER
"4/12/2018       RE: Sushant García  [address]   [place] [facility] [address]     Dear Colleague,    Thank you for referring your patient, Sushant García, to the [facility] UROLOGY AND INST FOR PROSTATE AND UROLOGIC CANCERS at [facility]. Please see a copy of my visit note below.    CC: Sushant García is post-op from bilateral scrotal exploration 3/27/18  HPI: Patient is 2 wks post op.  he has been doing well. No fevers or chills. Pain decreasing.   Exam: /73  Pulse 86  Ht 1.702 m (5' 7\")  Wt 83.5 kg (184 lb)  BMI 28.82 kg/m2   NAD.   Incision healing well. No discharge, erythema or fluctuence suggestive of infection.   No pain, hematoma, or hydrocele.    Discussed with him that intra-op we found his vasectomy was done in the inguinal canal bilaterally at the time of his previous inguinal hernia repairs.  This was not felt to be reparable.    I advise they pursue IVF.  IVF clinic list given.  He will contact me back if he'd like me to do the surgical sperm acquisition.  Discussed this can be done in the office with aspiration or via open biopsy.    PLAN:   F/U  PRN with me     Visit within post-op global.      Again, thank you for allowing me to participate in the care of your patient.      Sincerely,    Jan Gentile MD  "

## 2025-04-25 ENCOUNTER — LAB REQUISITION (OUTPATIENT)
Dept: LAB | Facility: CLINIC | Age: 75
End: 2025-04-25

## 2025-04-25 PROCEDURE — 36415 COLL VENOUS BLD VENIPUNCTURE: CPT | Performed by: INTERNAL MEDICINE

## 2025-04-25 PROCEDURE — 86481 TB AG RESPONSE T-CELL SUSP: CPT | Performed by: INTERNAL MEDICINE

## 2025-04-27 LAB
GAMMA INTERFERON BACKGROUND BLD IA-ACNC: 0.04 IU/ML
M TB IFN-G BLD-IMP: NEGATIVE
M TB IFN-G CD4+ BCKGRND COR BLD-ACNC: 9.96 IU/ML
MITOGEN IGNF BCKGRD COR BLD-ACNC: 0 IU/ML
MITOGEN IGNF BCKGRD COR BLD-ACNC: 0 IU/ML
QUANTIFERON MITOGEN: 10 IU/ML
QUANTIFERON NIL TUBE: 0.04 IU/ML
QUANTIFERON TB1 TUBE: 0.04 IU/ML
QUANTIFERON TB2 TUBE: 0.04

## (undated) DEVICE — SU PROLENE 2-0 CT-2 30" 8411H

## (undated) DEVICE — SU CHROMIC 3-0 SH 27" G122H

## (undated) DEVICE — SU NYLON 10-0 DA 1" AA-1824

## (undated) DEVICE — PREP CHLORAPREP 26ML TINTED ORANGE  260815

## (undated) DEVICE — ESU GROUND PAD ADULT W/CORD E7507

## (undated) DEVICE — BLADE CLIPPER SGL USE 9680

## (undated) DEVICE — GLOVE PROTEXIS W/NEU-THERA 8.5  2D73TE85

## (undated) DEVICE — BLADE KNIFE BEAVER MICROSHARP GREEN 377515

## (undated) DEVICE — LINEN TOWEL PACK X6 WHITE 5487

## (undated) DEVICE — SUCTION MANIFOLD NEPTUNE 2 SYS 1 PORT 702-025-000

## (undated) DEVICE — LINEN TOWEL PACK X5 5464

## (undated) DEVICE — ESU CORD BIPOLAR GREEN 10-4000

## (undated) DEVICE — NDL ANGIOCATH 14GA 1.25" 3068

## (undated) DEVICE — PACK MINOR CUSTOM ASC

## (undated) DEVICE — BLADE 35MM STR CBS-35

## (undated) DEVICE — GLOVE PROTEXIS W/NEU-THERA 7.5  2D73TE75

## (undated) DEVICE — DRAPE IOBAN INCISE 13X13" 6640EZ

## (undated) DEVICE — DRSG STERI STRIP 1/2X4" R1547

## (undated) DEVICE — NDL ANGIOCATH 24GA 0.75" 4053

## (undated) DEVICE — SOL NACL 0.9% IRRIG 1000ML BOTTLE 2F7124

## (undated) DEVICE — DRSG PRIMAPORE 02X3" 7133

## (undated) DEVICE — EYE SPONGE SPEAR WECK CEL 0008685

## (undated) DEVICE — SU ETHILON 9-0 CS160-6DA 12" 9011G

## (undated) DEVICE — WIPE INSTRUMENT MEROCEL

## (undated) DEVICE — SYR 03ML LL W/O NDL 309657

## (undated) DEVICE — RX BACITRACIN OINTMENT 0.9G 1/32OZ CUR001109

## (undated) DEVICE — DRSG KERLIX FLUFFS X5

## (undated) DEVICE — DRAPE MICROSCOPE LEICA 54X150" AR8033650

## (undated) DEVICE — SU MONOCRYL 4-0 PS-2 18" UND Y496G

## (undated) DEVICE — SUPPORTER ATHLETIC LG LATEX 202636

## (undated) DEVICE — ESU ELEC NDL 1" COATED/INSULATED E1465

## (undated) DEVICE — SOL WATER IRRIG 500ML BOTTLE 2F7113

## (undated) DEVICE — SU PDS II 6-0 RB-2DA 30" Z149H

## (undated) DEVICE — DRAPE LAP TRANSVERSE 29421

## (undated) DEVICE — STRAP UNIVERSAL POSITIONING 2-PIECE 4X47X76" 91-287

## (undated) DEVICE — CATH TRAY FOLEY SURESTEP 16FR W/DRAIN BAG LF A300416A

## (undated) DEVICE — SOL ADH LIQUID BENZOIN SWAB 0.6ML C1544

## (undated) RX ORDER — LIDOCAINE HYDROCHLORIDE 20 MG/ML
INJECTION, SOLUTION EPIDURAL; INFILTRATION; INTRACAUDAL; PERINEURAL
Status: DISPENSED
Start: 2018-03-27

## (undated) RX ORDER — FENTANYL CITRATE 50 UG/ML
INJECTION, SOLUTION INTRAMUSCULAR; INTRAVENOUS
Status: DISPENSED
Start: 2018-03-27

## (undated) RX ORDER — GABAPENTIN 300 MG/1
CAPSULE ORAL
Status: DISPENSED
Start: 2018-03-27

## (undated) RX ORDER — BUPIVACAINE HYDROCHLORIDE 5 MG/ML
INJECTION, SOLUTION EPIDURAL; INTRACAUDAL
Status: DISPENSED
Start: 2018-03-27

## (undated) RX ORDER — DEXAMETHASONE SODIUM PHOSPHATE 4 MG/ML
INJECTION, SOLUTION INTRA-ARTICULAR; INTRALESIONAL; INTRAMUSCULAR; INTRAVENOUS; SOFT TISSUE
Status: DISPENSED
Start: 2018-03-27

## (undated) RX ORDER — PROPOFOL 10 MG/ML
INJECTION, EMULSION INTRAVENOUS
Status: DISPENSED
Start: 2018-03-27

## (undated) RX ORDER — CEFAZOLIN SODIUM 1 G/3ML
INJECTION, POWDER, FOR SOLUTION INTRAMUSCULAR; INTRAVENOUS
Status: DISPENSED
Start: 2018-03-27

## (undated) RX ORDER — EPHEDRINE SULFATE 50 MG/ML
INJECTION, SOLUTION INTRAMUSCULAR; INTRAVENOUS; SUBCUTANEOUS
Status: DISPENSED
Start: 2018-03-27

## (undated) RX ORDER — PHENYLEPHRINE HCL IN 0.9% NACL 1 MG/10 ML
SYRINGE (ML) INTRAVENOUS
Status: DISPENSED
Start: 2018-03-27

## (undated) RX ORDER — ONDANSETRON 2 MG/ML
INJECTION INTRAMUSCULAR; INTRAVENOUS
Status: DISPENSED
Start: 2018-03-27

## (undated) RX ORDER — ACETAMINOPHEN 325 MG/1
TABLET ORAL
Status: DISPENSED
Start: 2018-03-27